# Patient Record
(demographics unavailable — no encounter records)

---

## 2025-04-29 NOTE — END OF VISIT
[FreeTextEntry3] : I, Dot Gudino, acted as a scribe on behalf of Dr. Dot Norwood M.D. on 04/29/2025 .   All medical entries made by the scribe were at my Dr. Dot Norwood M.D direction and personally dictated by me on  04/29/2025 . I have reviewed the chart and agree that the record accurately reflects my personal performance of the history, physical exam, assessment and plan. I have also personally directed, reviewed, and agreed with the chart.

## 2025-04-29 NOTE — PROCEDURE
[Colposcopy] : Colposcopy  [Risks] : risks [Benefits] : benefits [Alternatives] : alternatives [Patient] : patient [Infection] : infection [Bleeding] : bleeding [Allergic Reaction] : allergic reaction [ASCUS] : ASCUS [No Premedication] : no premedication [Colposcopy Adequate] : colposcopy adequate [SCI Fully Visualized] : SCI fully visualized [ECC Performed] : ECC performed [Biopsy] : biopsy taken [Hemostasis Obtained] : Hemostasis obtained [Tolerated Well] : the patient tolerated the procedure well [HPV High Risk] : HPV high risk [de-identified] : 2 [de-identified] : ECC 3 o'clock 9 o'clock [de-identified] : chronic cervicitis

## 2025-04-29 NOTE — HISTORY OF PRESENT ILLNESS
[FreeTextEntry1] : 32 y/o G0 female presents to re-establish care and for colposcopy. Pt had ASCUS HPV+ (neg 16/18) from a pap [place]. She currently takes Cathy for OCP.   Pt is currently living in [place], [place]. Her  is in the  and currently deployed.  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

## 2025-04-29 NOTE — PLAN
[FreeTextEntry1] : 32 y/o G0 female presents to re-establish care and for colposcopy.   -F/u pending results